# Patient Record
Sex: MALE | Race: WHITE
[De-identification: names, ages, dates, MRNs, and addresses within clinical notes are randomized per-mention and may not be internally consistent; named-entity substitution may affect disease eponyms.]

---

## 2019-01-11 ENCOUNTER — HOSPITAL ENCOUNTER (EMERGENCY)
Dept: HOSPITAL 72 - EMR | Age: 19
Discharge: HOME | End: 2019-01-11
Payer: COMMERCIAL

## 2019-01-11 VITALS — SYSTOLIC BLOOD PRESSURE: 110 MMHG | DIASTOLIC BLOOD PRESSURE: 60 MMHG

## 2019-01-11 VITALS
WEIGHT: 180 LBS | HEIGHT: 71 IN | DIASTOLIC BLOOD PRESSURE: 63 MMHG | SYSTOLIC BLOOD PRESSURE: 119 MMHG | BODY MASS INDEX: 25.2 KG/M2

## 2019-01-11 DIAGNOSIS — G40.909: Primary | ICD-10-CM

## 2019-01-11 LAB
ADD MANUAL DIFF: NO
ALBUMIN SERPL-MCNC: 4.8 G/DL (ref 3.4–5)
ALBUMIN/GLOB SERPL: 1.3 {RATIO} (ref 1–2.7)
ALP SERPL-CCNC: 66 U/L (ref 46–116)
ALT SERPL-CCNC: 27 U/L (ref 12–78)
ANION GAP SERPL CALC-SCNC: 21 MMOL/L (ref 5–15)
AST SERPL-CCNC: 14 U/L (ref 15–37)
BASOPHILS NFR BLD AUTO: 1 % (ref 0–2)
BILIRUB SERPL-MCNC: 0.9 MG/DL (ref 0.2–1)
BUN SERPL-MCNC: 20 MG/DL (ref 7–18)
CALCIUM SERPL-MCNC: 9.9 MG/DL (ref 8.5–10.1)
CHLORIDE SERPL-SCNC: 99 MMOL/L (ref 98–107)
CO2 SERPL-SCNC: 20 MMOL/L (ref 21–32)
CREAT SERPL-MCNC: 1.3 MG/DL (ref 0.55–1.3)
EOSINOPHIL NFR BLD AUTO: 0.6 % (ref 0–3)
ERYTHROCYTE [DISTWIDTH] IN BLOOD BY AUTOMATED COUNT: 10.9 % (ref 11.6–14.8)
GLOBULIN SER-MCNC: 3.8 G/DL
HCT VFR BLD CALC: 47.2 % (ref 42–52)
HGB BLD-MCNC: 16.2 G/DL (ref 14.2–18)
LYMPHOCYTES NFR BLD AUTO: 37.1 % (ref 20–45)
MCV RBC AUTO: 86 FL (ref 80–99)
MONOCYTES NFR BLD AUTO: 6.1 % (ref 1–10)
NEUTROPHILS NFR BLD AUTO: 55.3 % (ref 45–75)
PLATELET # BLD: 420 K/UL (ref 150–450)
POTASSIUM SERPL-SCNC: 4.1 MMOL/L (ref 3.5–5.1)
RBC # BLD AUTO: 5.48 M/UL (ref 4.7–6.1)
SODIUM SERPL-SCNC: 140 MMOL/L (ref 136–145)
WBC # BLD AUTO: 13.2 K/UL (ref 4.8–10.8)

## 2019-01-11 PROCEDURE — 80053 COMPREHEN METABOLIC PANEL: CPT

## 2019-01-11 PROCEDURE — 85025 COMPLETE CBC W/AUTO DIFF WBC: CPT

## 2019-01-11 PROCEDURE — 99284 EMERGENCY DEPT VISIT MOD MDM: CPT

## 2019-01-11 PROCEDURE — 80329 ANALGESICS NON-OPIOID 1 OR 2: CPT

## 2019-01-11 NOTE — EMERGENCY ROOM REPORT
History of Present Illness


General


Chief Complaint:  Seizure


Source:  Patient





Present Illness


HPI


18-year-old male presents ED for evaluation.  Patient is status post seizure.  

Had a witnessed seizure as by grandfather with walking next exam.  He caught 

the patient and there was no head injury.  Seizure lasted for approximately 1 

minute.  Foaming at the mouth.  Eyes rolled to the back of the head.  No tongue 

injury.  No incontinence.  Patient states he has history of seizures.  Was 

initially taking Klonopin but states it was not helping.  Is now prescribed 

Zonegram.  States he supposed to take 100 mg for the first 2 weeks than double  

the dosage to 200 mg.  Patient admits to missing his dosage this morning.  

States she's been feeling anxious.  Denies SI or HI.  Denies alcohol or drug 

use.  Denies any headache.  Notes abrasions to his right arm.  Denies any pain.

  No other aggravating relieving factors.  Denies any other associated symptoms


Allergies:  


Coded Allergies:  


     No Known Allergies (Unverified , 1/11/19)





Patient History


Past Medical History:  seizures, psych hx


Past Surgical History:  none


Pertinent Family History:  none


Social History:  Denies: smoking, alcohol use, drug use


Immunizations:  UTD


Reviewed Nursing Documentation:  PMH: Agreed; PSxH: Agreed





Nursing Documentation-PMH


Hx Seizures:  Yes





Review of Systems


All Other Systems:  negative except mentioned in HPI





Physical Exam





Vital Signs








  Date Time  Temp Pulse Resp B/P (MAP) Pulse Ox O2 Delivery O2 Flow Rate FiO2


 


1/11/19 13:12 97.7 112 18 108/64 100 Room Air  








Sp02 EP Interpretation:  reviewed, normal


General Appearance:  no apparent distress, alert, GCS 15, non-toxic


Head:  normocephalic, atraumatic


Eyes:  bilateral eye normal inspection, bilateral eye PERRL


ENT:  hearing grossly normal, normal pharynx, no angioedema, normal voice


Neck:  full range of motion, supple/symm/no masses


Respiratory:  chest non-tender, lungs clear, normal breath sounds, speaking 

full sentences


Cardiovascular #1:  regular rate, rhythm, no edema


Cardiovascular #2:  2+ carotid (R), 2+ carotid (L), 2+ radial (R), 2+ radial (L)

, 2+ dorsalis pedis (R), 2+ dorsalis pedis (L)


Gastrointestinal:  normal bowel sounds, non tender, soft, non-distended, no 

guarding, no rebound


Rectal:  deferred


Genitourinary:  normal inspection, no CVA tenderness


Musculoskeletal:  back normal, gait/station normal, normal range of motion, non-

tender


Neurologic:  alert, oriented x3, responsive, motor strength/tone normal, 

sensory intact, speech normal


Psychiatric:  judgement/insight normal, memory normal, mood/affect normal, no 

suicidal/homicidal ideation


Reflexes:  3+ bicep (R), 3+ bicep (L), 3+ tricep (R), 3+ tricep (L), 3+ knee (R)

, 3+ knee (L)


Skin:  normal color, no rash, warm/dry, well hydrated, abrasions - R forearm


Lymphatic:  no adenopathy





Medical Decision Making


Diagnostic Impression:  


 Primary Impression:  


 Seizure disorder


ER Course


Hospital Course 





18-year-old M presents to ED status post seizure.  





Differential diagnosis includes- breakthrough seizure, alcohol abuse, 

noncompliance with medication





Clinical course


Patient placed on stretcher.  Initial history and physical I ordered labs, IV 

fluids





Labs-electrolytes okay, minimal leukocytosis, hemoglobin/hematocrit stable.  

Alcohol level negative





Patient allowed to rest is now awake alert oriented x3.  ambulating without 

difficulty.  Family is at bedside and can take patient home.





Discussed findings with patient and family.  Patient is not taking his 

medication consistently at the same time every day.  He is also not taking his 

Prozac consistently.  Encouraged him to take both medications at the same time 

every day.





given zonegram here in ED.  safe for discharge and close outpatient follow-up. 

patient has a PMD





Diagnosis - seizure disorder





stable and discharged to home.  Followup with PMD.  Return to ED if symptoms 

recur or worsen





Labs








Test


  1/11/19


14:06


 


White Blood Count


  13.2 K/UL


(4.8-10.8)


 


Red Blood Count


  5.48 M/UL


(4.70-6.10)


 


Hemoglobin


  16.2 G/DL


(14.2-18.0)


 


Hematocrit


  47.2 %


(42.0-52.0)


 


Mean Corpuscular Volume 86 FL (80-99) 


 


Mean Corpuscular Hemoglobin


  29.6 PG


(27.0-31.0)


 


Mean Corpuscular Hemoglobin


Concent 34.3 G/DL


(32.0-36.0)


 


Red Cell Distribution Width


  10.9 %


(11.6-14.8)


 


Platelet Count


  420 K/UL


(150-450)


 


Mean Platelet Volume


  7.1 FL


(6.5-10.1)


 


Neutrophils (%) (Auto)


  55.3 %


(45.0-75.0)


 


Lymphocytes (%) (Auto)


  37.1 %


(20.0-45.0)


 


Monocytes (%) (Auto)


  6.1 %


(1.0-10.0)


 


Eosinophils (%) (Auto)


  0.6 %


(0.0-3.0)


 


Basophils (%) (Auto)


  1.0 %


(0.0-2.0)


 


Sodium Level


  140 MMOL/L


(136-145)


 


Potassium Level


  4.1 MMOL/L


(3.5-5.1)


 


Chloride Level


  99 MMOL/L


()


 


Carbon Dioxide Level


  20 MMOL/L


(21-32)


 


Anion Gap


  21 mmol/L


(5-15)


 


Blood Urea Nitrogen


  20 mg/dL


(7-18)


 


Creatinine


  1.3 MG/DL


(0.55-1.30)


 


Estimat Glomerular Filtration


Rate > 60 mL/min


(>60)


 


Glucose Level


  129 MG/DL


()


 


Calcium Level


  9.9 MG/DL


(8.5-10.1)


 


Total Bilirubin


  0.9 MG/DL


(0.2-1.0)


 


Aspartate Amino Transf


(AST/SGOT) 14 U/L (15-37) 


 


 


Alanine Aminotransferase


(ALT/SGPT) 27 U/L (12-78) 


 


 


Alkaline Phosphatase


  66 U/L


()


 


Total Protein


  8.6 G/DL


(6.4-8.2)


 


Albumin


  4.8 G/DL


(3.4-5.0)


 


Globulin 3.8 g/dL 


 


Albumin/Globulin Ratio 1.3 (1.0-2.7) 


 


Acetaminophen Level


  < 2 MCG/ML


(10-30)


 


Serum Alcohol < 3 mg/dL 











Last Vital Signs








  Date Time  Temp Pulse Resp B/P (MAP) Pulse Ox O2 Delivery O2 Flow Rate FiO2


 


1/11/19 13:19  86 13   Room Air  


 


1/11/19 13:19 97.7   119/63 97   








Status:  improved


Disposition:  HOME, SELF-CARE


Condition:  Stable











Freddy Torres MD Jan 11, 2019 14:30